# Patient Record
Sex: MALE | Race: ASIAN | NOT HISPANIC OR LATINO | URBAN - METROPOLITAN AREA
[De-identification: names, ages, dates, MRNs, and addresses within clinical notes are randomized per-mention and may not be internally consistent; named-entity substitution may affect disease eponyms.]

---

## 2021-03-11 ENCOUNTER — WEB ENCOUNTER (OUTPATIENT)
Dept: URBAN - METROPOLITAN AREA CLINIC 35 | Facility: CLINIC | Age: 46
End: 2021-03-11

## 2021-03-15 ENCOUNTER — OFFICE VISIT (OUTPATIENT)
Dept: URBAN - METROPOLITAN AREA CLINIC 33 | Facility: CLINIC | Age: 46
End: 2021-03-15

## 2021-03-15 VITALS
HEIGHT: 68 IN | HEART RATE: 84 BPM | OXYGEN SATURATION: 99 % | WEIGHT: 155 LBS | SYSTOLIC BLOOD PRESSURE: 118 MMHG | BODY MASS INDEX: 23.49 KG/M2 | DIASTOLIC BLOOD PRESSURE: 70 MMHG

## 2021-03-15 RX ORDER — LORATADINE 10 MG/1
1 TABLET TABLET ORAL ONCE A DAY
Qty: 30 | Status: ACTIVE | COMMUNITY

## 2021-03-15 RX ORDER — MECLIZINE HYDROCHLORIDE 25 MG/1
1 TABLET AS NEEDED TABLET ORAL BID
Qty: 60 TABLET | Status: ACTIVE | COMMUNITY

## 2021-03-15 NOTE — EXAM-MIGRATED EXAMINATIONS
GENERAL APPEARANCE: - alert, in no acute distress, well developed, well nourished;   HEAD: - normocephalic, atraumatic;   ORAL CAVITY: - Mallampati classI;   HEART: - no murmurs, regular rate and rhythm, S1, S2 normal;   LUNGS: - clear to auscultation bilaterally, good air movement, no wheezes, rales, rhonchi;   ABDOMEN: - bowel sounds present, no masses palpable, no organomegaly , no rebound tenderness, soft, nontender, nondistended;

## 2021-03-15 NOTE — HPI-MIGRATED HPI
;     Heartburn : 45 year old male who presents today for a consultation of heartburn symptoms. He report control of symptoms with dietary modifications over the course of several months, then symptoms returned 3 months ago. He states that when he eats certain foods likes whole wheat and spicy foods will trigger his heartburn. Denies dysphagia, globus, sour eructations, bloating and gas, indigestion, early satiety, changes in appetite, coughing, abdominal epigastric pain, or changes in bowel habits.  Patient states that they have not had any recent changes in their medications.  Patient denies having a EGD done in the past.  He has taken Aleve prn and has stopped due to the increase of heartburn.  Patient admits to some relief of symptoms when he takes Prilosec.;

## 2021-09-30 ENCOUNTER — OFFICE VISIT (OUTPATIENT)
Dept: URBAN - METROPOLITAN AREA CLINIC 35 | Facility: CLINIC | Age: 46
End: 2021-09-30

## 2021-09-30 VITALS
WEIGHT: 155.6 LBS | HEART RATE: 78 BPM | OXYGEN SATURATION: 98 % | DIASTOLIC BLOOD PRESSURE: 70 MMHG | BODY MASS INDEX: 23.58 KG/M2 | SYSTOLIC BLOOD PRESSURE: 115 MMHG | HEIGHT: 68 IN

## 2021-09-30 RX ORDER — LORATADINE 10 MG/1
1 TABLET TABLET ORAL ONCE A DAY
Qty: 30 | Status: ON HOLD | COMMUNITY

## 2021-09-30 RX ORDER — MECLIZINE HYDROCHLORIDE 25 MG/1
1 TABLET AS NEEDED TABLET ORAL BID
Qty: 60 TABLET | Status: ON HOLD | COMMUNITY

## 2021-09-30 NOTE — HPI-MIGRATED HPI
;     Heartburn : Patient is here to follow-up from his last visit, patient states he is still having heartburn. Patient admits his symptoms has not change since his last visit. Patient admits to taking OTC Pepcid for 30 days to help relieve his symptoms. Patient would like to discuss concerns about scheduling an EGD.  Last Visit (03/15/2021)45 year old male who presents today for a consultation of heartburn symptoms. He report control of symptoms with dietary modifications over the course of several months, then symptoms returned 3 months ago. He states that when he eats certain foods likes whole wheat and spicy foods will trigger his heartburn. Denies dysphagia, globus, sour eructations, bloating and gas, indigestion, early satiety, changes in appetite, coughing, abdominal epigastric pain, or changes in bowel habits.  Patient states that they have not had any recent changes in their medications.  Patient denies having a EGD done in the past.  He has taken Aleve prn and has stopped due to the increase of heartburn.  Patient admits to some relief of symptoms when he takes Pilose.;

## 2021-10-14 ENCOUNTER — OFFICE VISIT (OUTPATIENT)
Dept: URBAN - METROPOLITAN AREA SURGERY CENTER 8 | Facility: SURGERY CENTER | Age: 46
End: 2021-10-14

## 2021-11-02 ENCOUNTER — DASHBOARD ENCOUNTERS (OUTPATIENT)
Age: 46
End: 2021-11-02

## 2021-11-02 ENCOUNTER — OFFICE VISIT (OUTPATIENT)
Dept: URBAN - METROPOLITAN AREA CLINIC 35 | Facility: CLINIC | Age: 46
End: 2021-11-02

## 2021-11-02 ENCOUNTER — TELEPHONE ENCOUNTER (OUTPATIENT)
Dept: URBAN - METROPOLITAN AREA CLINIC 35 | Facility: CLINIC | Age: 46
End: 2021-11-02

## 2021-11-02 VITALS
BODY MASS INDEX: 23.46 KG/M2 | SYSTOLIC BLOOD PRESSURE: 119 MMHG | WEIGHT: 154.8 LBS | HEART RATE: 94 BPM | HEIGHT: 68 IN | DIASTOLIC BLOOD PRESSURE: 80 MMHG | OXYGEN SATURATION: 97 %

## 2021-11-02 PROBLEM — 275978004 SCREENING FOR MALIGNANT NEOPLASM OF COLON: Status: ACTIVE | Noted: 2021-11-02

## 2021-11-02 PROBLEM — 79922009 EPIGASTRIC PAIN: Status: ACTIVE | Noted: 2021-03-15

## 2021-11-02 PROBLEM — 16331000 HEARTBURN: Status: ACTIVE | Noted: 2021-03-15

## 2021-11-02 RX ORDER — SODIUM PICOSULFATE, MAGNESIUM OXIDE, AND ANHYDROUS CITRIC ACID 10; 3.5; 12 MG/160ML; G/160ML; G/160ML
AS DIRECTED LIQUID ORAL
Qty: 1 KIT | OUTPATIENT
Start: 2021-11-02

## 2021-11-02 RX ORDER — LORATADINE 10 MG/1
1 TABLET TABLET ORAL ONCE A DAY
Qty: 30 | Status: ON HOLD | COMMUNITY

## 2021-11-02 RX ORDER — MECLIZINE HYDROCHLORIDE 25 MG/1
1 TABLET AS NEEDED TABLET ORAL BID
Qty: 60 TABLET | Status: ON HOLD | COMMUNITY

## 2021-11-02 NOTE — HPI-MIGRATED HPI
;     Heartburn : Patient presents today for follow up to his EGD.  Since the procedure patient denies dysphagia, Globus, changes in appetite, and changes in bowel habits. Patient states he is not taking any OTC or prescribe medication.Patient admits to changing his diet. Patient states he is feeling much better.    Last Visit (09/30/2021)Patient is here to follow-up from his last visit, patient states he is still having heartburn. Patient admits his symptoms has not change since his last visit. Patient admits to taking OTC Pepcid for 30 days to help relieve his symptoms. Patient would like to discuss concerns about scheduling an EGD.  Last Visit (03/15/2021)45 year old male who presents today for a consultation of heartburn symptoms. He report control of symptoms with dietary modifications over the course of several months, then symptoms returned 3 months ago. He states that when he eats certain foods likes whole wheat and spicy foods will trigger his heartburn. Denies dysphagia, globus, sour eructations, bloating and gas, indigestion, early satiety, changes in appetite, coughing, abdominal epigastric pain, or changes in bowel habits.  Patient states that they have not had any recent changes in their medications.  Patient denies having a EGD done in the past.  He has taken Aleve prn and has stopped due to the increase of heartburn.  Patient admits to some relief of symptoms when he takes Pilose.;